# Patient Record
Sex: FEMALE | Race: BLACK OR AFRICAN AMERICAN | NOT HISPANIC OR LATINO | URBAN - METROPOLITAN AREA
[De-identification: names, ages, dates, MRNs, and addresses within clinical notes are randomized per-mention and may not be internally consistent; named-entity substitution may affect disease eponyms.]

---

## 2021-01-25 ENCOUNTER — OFFICE (OUTPATIENT)
Dept: URBAN - METROPOLITAN AREA CLINIC 19 | Facility: CLINIC | Age: 26
End: 2021-01-25

## 2021-01-25 VITALS
HEIGHT: 67 IN | SYSTOLIC BLOOD PRESSURE: 126 MMHG | OXYGEN SATURATION: 92 % | HEART RATE: 70 BPM | DIASTOLIC BLOOD PRESSURE: 76 MMHG | WEIGHT: 241 LBS

## 2021-01-25 DIAGNOSIS — R19.4 CHANGE IN BOWEL HABIT: ICD-10-CM

## 2021-01-25 DIAGNOSIS — Z86.16 PERSONAL HISTORY OF COVID-19: ICD-10-CM

## 2021-01-25 DIAGNOSIS — R63.0 ANOREXIA: ICD-10-CM

## 2021-01-25 DIAGNOSIS — D50.9 IRON DEFICIENCY ANEMIA, UNSPECIFIED: ICD-10-CM

## 2021-01-25 DIAGNOSIS — R10.9 UNSPECIFIED ABDOMINAL PAIN: ICD-10-CM

## 2021-01-25 DIAGNOSIS — R14.0 ABDOMINAL DISTENSION (GASEOUS): ICD-10-CM

## 2021-01-25 DIAGNOSIS — K92.1 MELENA: ICD-10-CM

## 2021-01-25 PROCEDURE — 99204 OFFICE O/P NEW MOD 45 MIN: CPT

## 2021-01-25 RX ORDER — SODIUM PICOSULFATE, MAGNESIUM OXIDE, AND ANHYDROUS CITRIC ACID 10; 3.5; 12 MG/160ML; G/160ML; G/160ML
LIQUID ORAL
Qty: 1 | Refills: 0 | Status: COMPLETED
Start: 2021-01-25 | End: 2021-02-02

## 2021-01-25 NOTE — SERVICEHPINOTES
25-year-old single black female here for complaints of change in bowel habits, hematochezia, rectal bleeding and bloating.  Her change in bowel habits began a few months ago with progressive constipation.  She apparently went to the ED on 11/29/20 for complaints of lower abdominal pain and constipation.  She was found to a UTI and given Cipro and nitrofurantoin for treatment of this as well as lactulose for her constipation.  UTI has since resolved, but her constipation has only worsened.  She did test positive for Covid-19 on 1/5/21.  This has since resolved and she has completed 14 days of quarantine.  Her constipation has only worsened during this period and over the last 10 days she has had intermittent episodes of hematochezia and rectal bleeding.  She has had significant amount bright red blood with “clots” dripping into the toilet bowl.  She has had increased bloating and “abdominal swelling” after she eats.  She has lower abdominal cramping she is so seeds with her bowel movements.  She has a history of iron deficiency anemia due to her menstrual cycles for which she takes a daily iron supplement.  She denies any previous GI illnesses or studies.  Family history is negative for IBD.  Her grandmother and grandfather did have colon polyps in their 50s.

## 2021-01-25 NOTE — SERVICENOTES
The patient's assessment was reviewed with Dr. Barajas and a collaborative plan of care was established.

## 2021-02-02 ENCOUNTER — OFFICE (OUTPATIENT)
Dept: URBAN - METROPOLITAN AREA PATHOLOGY 22 | Facility: PATHOLOGY | Age: 26
End: 2021-02-02
Payer: COMMERCIAL

## 2021-02-02 ENCOUNTER — AMBULATORY SURGICAL CENTER (OUTPATIENT)
Dept: URBAN - METROPOLITAN AREA SURGERY 2 | Facility: SURGERY | Age: 26
End: 2021-02-02
Payer: COMMERCIAL

## 2021-02-02 VITALS
DIASTOLIC BLOOD PRESSURE: 92 MMHG | OXYGEN SATURATION: 99 % | SYSTOLIC BLOOD PRESSURE: 148 MMHG | SYSTOLIC BLOOD PRESSURE: 129 MMHG | DIASTOLIC BLOOD PRESSURE: 88 MMHG | DIASTOLIC BLOOD PRESSURE: 64 MMHG | HEART RATE: 86 BPM | SYSTOLIC BLOOD PRESSURE: 160 MMHG | DIASTOLIC BLOOD PRESSURE: 75 MMHG | HEIGHT: 67 IN | TEMPERATURE: 97.5 F | RESPIRATION RATE: 18 BRPM | HEART RATE: 97 BPM | WEIGHT: 230 LBS | OXYGEN SATURATION: 100 % | OXYGEN SATURATION: 98 % | TEMPERATURE: 98.3 F | HEART RATE: 101 BPM | HEART RATE: 92 BPM | DIASTOLIC BLOOD PRESSURE: 76 MMHG | RESPIRATION RATE: 16 BRPM

## 2021-02-02 DIAGNOSIS — K63.89 OTHER SPECIFIED DISEASES OF INTESTINE: ICD-10-CM

## 2021-02-02 DIAGNOSIS — K62.5 HEMORRHAGE OF ANUS AND RECTUM: ICD-10-CM

## 2021-02-02 DIAGNOSIS — R63.0 ANOREXIA: ICD-10-CM

## 2021-02-02 DIAGNOSIS — R19.4 CHANGE IN BOWEL HABIT: ICD-10-CM

## 2021-02-02 PROBLEM — R19.8: Status: ACTIVE | Noted: 2021-02-02

## 2021-02-02 PROCEDURE — 88305 TISSUE EXAM BY PATHOLOGIST: CPT | Performed by: INTERNAL MEDICINE

## 2021-02-02 PROCEDURE — 45380 COLONOSCOPY AND BIOPSY: CPT | Performed by: INTERNAL MEDICINE

## 2021-02-02 PROCEDURE — 43239 EGD BIOPSY SINGLE/MULTIPLE: CPT | Mod: 51 | Performed by: INTERNAL MEDICINE

## 2021-02-11 ENCOUNTER — OFFICE (OUTPATIENT)
Dept: URBAN - METROPOLITAN AREA CLINIC 19 | Facility: CLINIC | Age: 26
End: 2021-02-11

## 2021-02-11 VITALS
HEART RATE: 85 BPM | HEIGHT: 67 IN | DIASTOLIC BLOOD PRESSURE: 85 MMHG | WEIGHT: 244 LBS | OXYGEN SATURATION: 99 % | SYSTOLIC BLOOD PRESSURE: 146 MMHG

## 2021-02-11 DIAGNOSIS — K62.5 HEMORRHAGE OF ANUS AND RECTUM: ICD-10-CM

## 2021-02-11 DIAGNOSIS — R19.4 CHANGE IN BOWEL HABIT: ICD-10-CM

## 2021-02-11 DIAGNOSIS — D50.9 IRON DEFICIENCY ANEMIA, UNSPECIFIED: ICD-10-CM

## 2021-02-11 PROCEDURE — 99214 OFFICE O/P EST MOD 30 MIN: CPT

## 2021-02-11 NOTE — SERVICEHPINOTES
25-year-old single black female returns with her mother for continued complaints of rectal bleeding and severe constipation.I initially saw her on 1/25/21 for complaints of change in bowel habits, hematochezia, rectal bleeding and bloating.  She has had progressive severe constipation for the last several months.  She did go to the ED on 11/29/20 for complaints of lower abdominal pain and constipation.  She was found to have a UTI and given Cipro and nitrofurantoin for treatment of this as well as lactulose for her constipation.  UTI has since resolved, but her constipation has only worsened.  She did test positive for Covid-19 on 1/5/21. This has since resolved and she has completed 14 days of quarantine. Her constipation has only worsened during this period and she started having several days of hematochezia and rectal bleeding, including significant amount of bright red blood and “clots” dripping into the toilet bowl.  Routine lab work with her PCP on 1/25/21 was only remarkable for mild iron deficiency anemia [Iron=34.5 (range: )].  Hematocrit was normal. She is on iron supplements.  EGD/colonoscopy 2/2/21 was normal. Small bowel biopsies were negative for celiac disease and random colon biopsies were negative for microscopic colitis.  She was given samples of Linzess 145 mcg/d, but only reports having a few bowel movements since her scopes.  She has continued to have intermittent episodes of rectal bleeding associated with her bowel movements and has had excess straining and tenesmus.Family history is negative for IBD. Her grandmother and grandfather did have colon polyps in their 50s.

## 2021-03-15 PROBLEM — K92.1 HEMATOCHEZIA: Status: ACTIVE | Noted: 2021-02-02

## 2021-03-15 PROBLEM — K92.2 EVALUATION OF UNEXPLAINED GI BLEEDING: Status: ACTIVE | Noted: 2021-02-02

## 2021-03-15 PROBLEM — R19.8 CHANGE IN BOWEL HABITS: Status: ACTIVE | Noted: 2021-02-02
